# Patient Record
Sex: FEMALE | Race: AMERICAN INDIAN OR ALASKA NATIVE | HISPANIC OR LATINO
[De-identification: names, ages, dates, MRNs, and addresses within clinical notes are randomized per-mention and may not be internally consistent; named-entity substitution may affect disease eponyms.]

---

## 2022-10-05 PROBLEM — Z00.00 ENCOUNTER FOR PREVENTIVE HEALTH EXAMINATION: Status: ACTIVE | Noted: 2022-10-05

## 2022-10-07 ENCOUNTER — APPOINTMENT (OUTPATIENT)
Dept: COLORECTAL SURGERY | Facility: CLINIC | Age: 52
End: 2022-10-07

## 2022-10-07 NOTE — HISTORY OF PRESENT ILLNESS
[FreeTextEntry1] : 50 y/o F presents for second opinion of adenocarcinoma of the colon\par \par \par CEA \par 1.0 ng/mL (6/1/22)\par \par Most recent colonoscopy (5/26/22)\par Brushing: splenic flexure positive for malignant cells. Pathology (5/26/22)  c/w  Adenocarcinoma. \par Splenic flexure biopsy: Invasive adenocarcinoma, moderately differentiated. \par \par Colonoscopy [facility]  9/26/22: \par A severe stenosis found in the proximal sigmoid colon in the distal descending colon and was non-traversed. Biopsies taken. \par The retroflexed view of the distal rectum and anal verge was normal and showed no anal or rectal abnormalities \par \par Descending colon, stricture, biopsy (9/26/22)\par Colonic mucosa with focal Paneth cell metaplasia, hyperplastic change and erosion.\par Negative for features of lymphocytic or collagenous colitis \par Negative for active or granulomatous inflammation \par Negative for dysplasia or malignancy \par \par CT C/A/P 8/29/22\par Impression: \par Pancolitis \par \par CT C/A/P (6/9/22) \par Impression: \par No evidence to suggest metastatic disease in the chest, abdomen or pelvis. \par Scattered diverticular without evidence of diverticulitis. \par \par ADDENDUM (6/13/22): \par After further clinical information, patient w/biopsy proven adenocarcinoma of the splenic flexure, the images reviewed and the following findings made: \par No thickening is noted in the splenic flexure, there is a vague area of thickening of the sigmoid colon near its juncture with the left colon, measures 1.9 x 1.6 cm. \par \par PET-CT 6/17/22: \par Approximately 2 cm segment of hypermetabolic thickening of the descending colon near the juncture with sigmoid colon. \par Second area of focal hypermetabolic activity in the ascending colon near the hepatic flexure. This area of colon is not well opacified, which limits its evaluation. \par Otherwise, no abnormal hypermetabolic activity to suggest malignancy at this time\par Diverticular disease \par \par Patient was seen by outside surgery team on 6/21/22, surgery was offered, patient declined. \par \par Colonoscopy 12/2019: \par Transverse colon polyp, flat polyp 1.8 cm - Hyperplastic polyp \par Splenic flexure polyp, flat polyp 1.5 cm Tubular adenoma \par \par \par \par